# Patient Record
Sex: FEMALE | Race: WHITE | NOT HISPANIC OR LATINO | Employment: FULL TIME | URBAN - METROPOLITAN AREA
[De-identification: names, ages, dates, MRNs, and addresses within clinical notes are randomized per-mention and may not be internally consistent; named-entity substitution may affect disease eponyms.]

---

## 2023-11-28 ENCOUNTER — PROCEDURE VISIT (OUTPATIENT)
Dept: OTOLARYNGOLOGY | Facility: CLINIC | Age: 53
End: 2023-11-28
Payer: COMMERCIAL

## 2023-11-28 DIAGNOSIS — J38.3 ADDUCTOR SPASMODIC DYSPHONIA: Primary | ICD-10-CM

## 2023-11-28 DIAGNOSIS — R49.0 HOARSENESS OF VOICE: ICD-10-CM

## 2023-11-28 PROCEDURE — 64617 CHEMODENER MUSCLE LARYNX EMG: CPT | Performed by: OTOLARYNGOLOGY

## 2023-11-28 NOTE — PROGRESS NOTES
Chief Complaint  No chief complaint on file.       Pertinent History:  Here for Botox     Procedure:  Procedure: Botox injection, larynx  DX: Adductor spasmodic dysphonia, Hoarseness     This is followup after last injection on 2023  Interval from last injection 14 weeks  Interval of Breathiness: 1.5 weeks    Interval of Leeann Mouse: none   Interval of Good Voicin.5 weeks  Interval of recurring breaks: 2-3 days      We reviewed the risks and benefits of Botox injection to include but not be limited to bleeding, bruising, vocal cord swelling with change in breathing, airway issue, temporary change in voice with breathiness/whispered quality, difficulty swallowing which may require temporary dietary modification, the temporary nature of the injectable and need for repeat injections as well as variability between injections. Her questions are answered and they consent to proceed.     Anesthesia: none   Last dosage: 0.625 to bilateral TAs. She is happy with this dosage. She recently underwent bariatric surgery and hiatal hernia repair 3 weeks. She is doing well and lost 30 lbs since the procedure.      Description of Procedure: The patient is laid supine on the exam room chair with the neck in gentle extension over the head rest. A Glenn coated hollow bore needle is advanced via the CT space until air signal is encountered on the EMG. The muscles are localized with appropriate phonatory tasks and concomitant EMG activation visualized. I advanced into the following muscles:         Muscle  Dosage  Recruitment   Difficulty  1.  R TA   0.625 U   4+     1  2.  L TA   0.625 U   4+     1     Total units injected: 1.25 U  Waste: 98 U  Total billed: 100 U     This onabotulinum toxin A was utilized from a single use vial. The patient tolerated the procedure well.       Scribe Attestation  By signing my name below, ITerri Scribe attest that this documentation has been prepared under the direction and in  the presence of Teressa Pryor MD.

## 2024-01-02 ENCOUNTER — SPECIALTY PHARMACY (OUTPATIENT)
Dept: PHARMACY | Facility: CLINIC | Age: 54
End: 2024-01-02

## 2024-01-08 DIAGNOSIS — R49.0 HOARSENESS OF VOICE: ICD-10-CM

## 2024-01-08 DIAGNOSIS — J38.3 ADDUCTOR SPASMODIC DYSPHONIA: ICD-10-CM

## 2024-01-16 DIAGNOSIS — J38.3 ADDUCTOR SPASMODIC DYSPHONIA: ICD-10-CM

## 2024-01-16 DIAGNOSIS — R49.0 HOARSENESS OF VOICE: ICD-10-CM

## 2024-01-23 ENCOUNTER — SPECIALTY PHARMACY (OUTPATIENT)
Dept: PHARMACY | Facility: CLINIC | Age: 54
End: 2024-01-23

## 2024-01-26 ENCOUNTER — PREP FOR PROCEDURE (OUTPATIENT)
Dept: OTOLARYNGOLOGY | Facility: CLINIC | Age: 54
End: 2024-01-26
Payer: COMMERCIAL

## 2024-02-13 ENCOUNTER — APPOINTMENT (OUTPATIENT)
Dept: OTOLARYNGOLOGY | Facility: CLINIC | Age: 54
End: 2024-02-13
Payer: COMMERCIAL

## 2024-02-27 ENCOUNTER — APPOINTMENT (OUTPATIENT)
Dept: OTOLARYNGOLOGY | Facility: CLINIC | Age: 54
End: 2024-02-27
Payer: COMMERCIAL

## 2024-03-12 ENCOUNTER — PROCEDURE VISIT (OUTPATIENT)
Dept: OTOLARYNGOLOGY | Facility: CLINIC | Age: 54
End: 2024-03-12
Payer: COMMERCIAL

## 2024-03-12 VITALS — WEIGHT: 173 LBS | BODY MASS INDEX: 27.15 KG/M2 | HEIGHT: 67 IN

## 2024-03-12 DIAGNOSIS — R49.0 HOARSENESS OF VOICE: ICD-10-CM

## 2024-03-12 DIAGNOSIS — J38.3 ADDUCTOR SPASMODIC DYSPHONIA: Primary | ICD-10-CM

## 2024-03-12 PROCEDURE — 64617 CHEMODENER MUSCLE LARYNX EMG: CPT | Performed by: OTOLARYNGOLOGY

## 2024-03-12 RX ORDER — SIMVASTATIN 20 MG/1
20 TABLET, FILM COATED ORAL NIGHTLY
COMMUNITY
Start: 2012-08-06

## 2024-03-12 RX ORDER — LEVOTHYROXINE SODIUM 75 UG/1
75 TABLET ORAL
COMMUNITY
Start: 2024-02-15

## 2024-03-12 RX ORDER — CITALOPRAM 40 MG/1
40 TABLET, FILM COATED ORAL
COMMUNITY
Start: 2024-02-15

## 2024-03-12 ASSESSMENT — PATIENT HEALTH QUESTIONNAIRE - PHQ9
SUM OF ALL RESPONSES TO PHQ9 QUESTIONS 1 AND 2: 0
2. FEELING DOWN, DEPRESSED OR HOPELESS: NOT AT ALL
1. LITTLE INTEREST OR PLEASURE IN DOING THINGS: NOT AT ALL

## 2024-03-12 NOTE — PROGRESS NOTES
Chief Complaint  Chief Complaint   Patient presents with    Botulinum Toxin Injection        Pertinent History:  Here for Botox     Procedure:  Procedure: Botox injection, larynx  DX: Adductor spasmodic dysphonia, Hoarseness     This is followup after last injection on 2023  Interval from last injection 15 weeks  Interval of Breathiness: 2 weeks    Interval of Leeann Mouse: none    Interval of Good Voicin weeks   Interval of recurring breaks: 1 weeks      We reviewed the risks and benefits of Botox injection to include but not be limited to bleeding, bruising, vocal cord swelling with change in breathing, airway issue, temporary change in voice with breathiness/whispered quality, difficulty swallowing which may require temporary dietary modification, the temporary nature of the injectable and need for repeat injections as well as variability between injections. Her questions are answered and they consent to proceed.     Anesthesia: none   Last dosage: 0.625 to bilateral TAs. She is happy with this dosage. She reports a 55 lbs weight loss. She is no longer using her CPAP.      Description of Procedure: The patient is laid supine on the exam room chair with the neck in gentle extension over the head rest. A Glenn coated hollow bore needle is advanced via the CT space until air signal is encountered on the EMG. The muscles are localized with appropriate phonatory tasks and concomitant EMG activation visualized. I advanced into the following muscles:         Muscle  Dosage  Recruitment   Difficulty  1.  R TA   0.5 U    4+     1  2.  L TA   0.5 U    4+     1     Total units injected: 1 U  Waste: 97 U  Total billed: 100 U     This onabotulinum toxin A was utilized from a single use vial. The patient tolerated the procedure well.       Scribe Attestation  By signing my name below, ITerri Scribe attest that this documentation has been prepared under the direction and in the presence of Teressa FRANKEL  MD Konstantin.

## 2024-07-09 ENCOUNTER — APPOINTMENT (OUTPATIENT)
Dept: OTOLARYNGOLOGY | Facility: CLINIC | Age: 54
End: 2024-07-09
Payer: COMMERCIAL

## 2024-07-09 DIAGNOSIS — J38.3 ADDUCTOR SPASMODIC DYSPHONIA: Primary | ICD-10-CM

## 2024-07-09 DIAGNOSIS — R49.0 HOARSENESS OF VOICE: ICD-10-CM

## 2024-07-09 PROCEDURE — 64617 CHEMODENER MUSCLE LARYNX EMG: CPT | Performed by: OTOLARYNGOLOGY

## 2024-07-09 ASSESSMENT — PATIENT HEALTH QUESTIONNAIRE - PHQ9
2. FEELING DOWN, DEPRESSED OR HOPELESS: NOT AT ALL
SUM OF ALL RESPONSES TO PHQ9 QUESTIONS 1 AND 2: 0
1. LITTLE INTEREST OR PLEASURE IN DOING THINGS: NOT AT ALL

## 2024-07-09 NOTE — PROGRESS NOTES
Chief Complaint  Chief Complaint   Patient presents with    Botulinum Toxin Injection        Pertinent History:  Here for Botox     Procedure:  Procedure: Botox injection, larynx  DX: Adductor spasmodic dysphonia, Hoarseness     This is followup after last injection on 03/12/2024  Interval from last injection 17 weeks  Interval of Breathiness: none   Interval of Leeann Mouse: none    Interval of Good Voicing: 15 weeks   Interval of recurring breaks: 2 weeks      We reviewed the risks and benefits of Botox injection to include but not be limited to bleeding, bruising, vocal cord swelling with change in breathing, airway issue, temporary change in voice with breathiness/whispered quality, difficulty swallowing which may require temporary dietary modification, the temporary nature of the injectable and need for repeat injections as well as variability between injections. Her questions are answered and they consent to proceed.     Anesthesia: none   Last dosage: 0.5 to bilateral TAs. She is happy with this dosage but had increased interval of breaks. Will try .55.  She reports 69 lbs weight loss. She is sleeping better and is not using her CPAP machine.      Description of Procedure: The patient is laid supine on the exam room chair with the neck in gentle extension over the head rest. A Glenn coated hollow bore needle is advanced via the CT space until air signal is encountered on the EMG. The muscles are localized with appropriate phonatory tasks and concomitant EMG activation visualized. I advanced into the following muscles:         Muscle  Dosage  Recruitment   Difficulty  1.  R TA   0.55 U    4+     1  2.  L TA   0.55 U    4+     1     Total units injected: 1.1 U  Waste: 98 U  Total billed: 100 U     This onabotulinum toxin A was utilized from a single use vial. The patient tolerated the procedure well.       Scribe Attestation  By signing my name below, ITerri , Williamibzay attest that this documentation  has been prepared under the direction and in the presence of Teressa Pryor MD.

## 2024-10-08 ENCOUNTER — APPOINTMENT (OUTPATIENT)
Dept: OTOLARYNGOLOGY | Facility: CLINIC | Age: 54
End: 2024-10-08
Payer: COMMERCIAL

## 2024-10-08 DIAGNOSIS — R49.0 HOARSENESS OF VOICE: ICD-10-CM

## 2024-10-08 DIAGNOSIS — J38.3 ADDUCTOR SPASMODIC DYSPHONIA: Primary | ICD-10-CM

## 2024-10-08 PROCEDURE — 64617 CHEMODENER MUSCLE LARYNX EMG: CPT | Performed by: OTOLARYNGOLOGY

## 2024-10-08 RX ORDER — OMEPRAZOLE 20 MG/1
20 CAPSULE, DELAYED RELEASE ORAL
COMMUNITY

## 2024-10-08 RX ORDER — MONTELUKAST SODIUM 4 MG/1
1 TABLET, CHEWABLE ORAL 2 TIMES DAILY
COMMUNITY
Start: 2024-09-12 | End: 2024-10-12

## 2024-10-08 RX ORDER — BISMUTH SUBSALICYLATE 262 MG
1 TABLET,CHEWABLE ORAL DAILY
COMMUNITY

## 2024-10-08 ASSESSMENT — PATIENT HEALTH QUESTIONNAIRE - PHQ9
1. LITTLE INTEREST OR PLEASURE IN DOING THINGS: NOT AT ALL
SUM OF ALL RESPONSES TO PHQ9 QUESTIONS 1 AND 2: 0
2. FEELING DOWN, DEPRESSED OR HOPELESS: NOT AT ALL

## 2024-10-08 NOTE — PROGRESS NOTES
Chief Complaint  Chief Complaint   Patient presents with    Botulinum Toxin Injection        Pertinent History:  Here for Botox     Procedure:  Procedure: Botox injection, larynx  DX: Adductor spasmodic dysphonia, Hoarseness     This is followup after last injection on 2024  Interval from last injection 13 weeks  Interval of Breathiness: 2 weeks   Interval of Leeann Mouse: none    Interval of Good Voicin weeks   Interval of recurring breaks: 2 weeks      We reviewed the risks and benefits of Botox injection to include but not be limited to bleeding, bruising, vocal cord swelling with change in breathing, airway issue, temporary change in voice with breathiness/whispered quality, difficulty swallowing which may require temporary dietary modification, the temporary nature of the injectable and need for repeat injections as well as variability between injections. Her questions are answered and they consent to proceed.     Anesthesia: none   Last dosage: 0.5 to bilateral TAs. She is happy with this dosage but had increased interval of breaks. Will try .55.  She reports 69 lbs weight loss. She is sleeping better and is not using her CPAP machine.      Description of Procedure: The patient is laid supine on the exam room chair with the neck in gentle extension over the head rest. A Glenn coated hollow bore needle is advanced via the CT space until air signal is encountered on the EMG. The muscles are localized with appropriate phonatory tasks and concomitant EMG activation visualized. I advanced into the following muscles:         Muscle  Dosage  Recruitment   Difficulty  1.  R TA   0.5 U    4+     1  2.  L TA   0.5 U    4+     1     Total units injected: 1 U  Waste: 98 U  Total billed: 100 U     This onabotulinum toxin A was utilized from a single use vial. The patient tolerated the procedure well.       Scribe Attestation  By signing my name below, I, Terri Nino , Williamibe attest that this documentation  has been prepared under the direction and in the presence of Teressa Pryor MD.

## 2025-01-14 ENCOUNTER — APPOINTMENT (OUTPATIENT)
Dept: OTOLARYNGOLOGY | Facility: CLINIC | Age: 55
End: 2025-01-14
Payer: COMMERCIAL